# Patient Record
Sex: MALE | URBAN - NONMETROPOLITAN AREA
[De-identification: names, ages, dates, MRNs, and addresses within clinical notes are randomized per-mention and may not be internally consistent; named-entity substitution may affect disease eponyms.]

---

## 2019-11-15 ENCOUNTER — OFFICE VISIT (OUTPATIENT)
Dept: URBAN - NONMETROPOLITAN AREA CLINIC 9 | Facility: CLINIC | Age: 79
End: 2019-11-15
Payer: MEDICARE

## 2019-11-15 DIAGNOSIS — H44.522 PHTHISIS BULBI OF LEFT EYE: Chronic | ICD-10-CM

## 2019-11-15 DIAGNOSIS — Z96.1 PRESENCE OF INTRAOCULAR LENS: Chronic | ICD-10-CM

## 2019-11-15 DIAGNOSIS — H44.512 ABSOLUTE GLAUCOMA OF LEFT EYE: Chronic | ICD-10-CM

## 2019-11-15 DIAGNOSIS — E11.9 TYPE 2 DIABETES MELLITUS W/O COMPLICATION: Primary | Chronic | ICD-10-CM

## 2019-11-15 DIAGNOSIS — H43.811 VITREOUS DEGENERATION, RIGHT EYE: ICD-10-CM

## 2019-11-15 PROCEDURE — 92004 COMPRE OPH EXAM NEW PT 1/>: CPT | Performed by: OPTOMETRIST

## 2019-11-15 PROCEDURE — 92133 CPTRZD OPH DX IMG PST SGM ON: CPT | Performed by: OPTOMETRIST

## 2019-11-15 RX ORDER — LATANOPROST 50 UG/ML
0.005 % SOLUTION OPHTHALMIC
Qty: 1 | Refills: 6 | Status: INACTIVE
Start: 2019-11-15 | End: 2020-10-12

## 2019-11-15 ASSESSMENT — VISUAL ACUITY
OS: NLP
OD: 20/20

## 2019-11-15 ASSESSMENT — INTRAOCULAR PRESSURE
OD: 19
OS: 28

## 2019-11-15 ASSESSMENT — KERATOMETRY: OD: 42.38

## 2019-11-15 NOTE — IMPRESSION/PLAN
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 Plan: Intraocular pressure elevated, and stable. Restart Latanoprost OU QHS. NLP OS for about 25+ years.

## 2019-11-15 NOTE — IMPRESSION/PLAN
Impression: Type 2 diabetes mellitus w/o complication: H54.8. OD. Plan: Diabetes type II: no background diabetic retinopathy, no signs of neovascularization noted OD, no posterior view OS. Discussed ocular and systemic benefits of blood sugar control. Continue to monitor for changes. Advised patient to RTC immediately if changes to vision are noted.

## 2019-11-15 NOTE — IMPRESSION/PLAN
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 Plan: NLP OS for about 25 years, +CVA. Discussed diagnosis with patient in detail. Will continue to observe condition and/or symptoms. Patient instructed to call if condition gets worse.

## 2019-11-15 NOTE — IMPRESSION/PLAN
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 Plan: Intraocular pressure elevated, will restart medications. New prescription(s) given to patient, Latanoprost OU QHS. Order OCT ON, reveals OD severe thinning inferior, OS unable to scan. Education on importance of compliance with glaucoma drops and exam appointments.

## 2020-10-12 ENCOUNTER — OFFICE VISIT (OUTPATIENT)
Dept: URBAN - NONMETROPOLITAN AREA CLINIC 9 | Facility: CLINIC | Age: 80
End: 2020-10-12
Payer: MEDICARE

## 2020-10-12 DIAGNOSIS — H40.1114 PRIMARY OPEN-ANGLE GLAUCOMA, RIGHT EYE, INDETERMINATE STAGE: Chronic | ICD-10-CM

## 2020-10-12 DIAGNOSIS — H04.123 DRY EYE SYNDROME OF BILATERAL LACRIMAL GLANDS: Chronic | ICD-10-CM

## 2020-10-12 PROCEDURE — 92014 COMPRE OPH EXAM EST PT 1/>: CPT | Performed by: OPTOMETRIST

## 2020-10-12 PROCEDURE — 92133 CPTRZD OPH DX IMG PST SGM ON: CPT | Performed by: OPTOMETRIST

## 2020-10-12 RX ORDER — MULTIVIT-MIN/FERROUS FUMARATE 9 MG/15 ML
LIQUID (ML) ORAL
Qty: 1 | Refills: 11 | Status: ACTIVE
Start: 2020-10-12

## 2020-10-12 RX ORDER — LATANOPROST 50 UG/ML
0.005 % SOLUTION OPHTHALMIC
Qty: 1 | Refills: 6 | Status: ACTIVE
Start: 2020-10-12

## 2020-10-12 ASSESSMENT — INTRAOCULAR PRESSURE
OD: 18
OS: 23

## 2020-10-12 ASSESSMENT — VISUAL ACUITY: OD: 20/20

## 2020-10-12 NOTE — IMPRESSION/PLAN
Impression: Type 2 diabetes mellitus w/o complication: H19.0. OD. Plan: Diabetes type II: no background diabetic retinopathy, no signs of neovascularization noted OD, OS no view. Discussed ocular and systemic benefits of blood sugar control. Continue to monitor for changes. Advised patient to RTC immediately if changes to vision are noted.

## 2020-10-12 NOTE — IMPRESSION/PLAN
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 Plan: Intraocular pressure stable OS. Restart Latanoprost OU QHS. NLP OS for about 25+ years.

## 2020-10-12 NOTE — IMPRESSION/PLAN
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 Plan: IOP elevated OU. Restart Latanoprost OU QHS (printed Rx with instructions for caregiver). Order OCT ON, reviewed today, OD mild thinning from previous scan year 2015 and OS unable to scan/no view. Education on importance of compliance with glaucoma drops and exam appointments.